# Patient Record
Sex: MALE | Race: WHITE | Employment: PART TIME | URBAN - METROPOLITAN AREA
[De-identification: names, ages, dates, MRNs, and addresses within clinical notes are randomized per-mention and may not be internally consistent; named-entity substitution may affect disease eponyms.]

---

## 2021-07-08 ENCOUNTER — THERAPY VISIT (OUTPATIENT)
Dept: PHYSICAL THERAPY | Facility: CLINIC | Age: 61
End: 2021-07-08
Payer: OTHER MISCELLANEOUS

## 2021-07-08 DIAGNOSIS — M25.511 ACUTE PAIN OF RIGHT SHOULDER: ICD-10-CM

## 2021-07-08 PROCEDURE — 97110 THERAPEUTIC EXERCISES: CPT | Mod: GP | Performed by: PHYSICAL THERAPIST

## 2021-07-08 PROCEDURE — 97161 PT EVAL LOW COMPLEX 20 MIN: CPT | Mod: GP | Performed by: PHYSICAL THERAPIST

## 2021-07-08 NOTE — LETTER
"[facility]  [address] [place] [address]  [facility] [id number] [phone]    2021    Re: Jeff Goldstein   :   1960  MRN:  [id number]   REFERRING PHYSICIAN:   Butch [facility]    Date of Initial Evaluation:  2021  Visits:  Rxs Used: 1  Reason for Referral:  Acute pain of right shoulder    EVALUATION SUMMARY    Physical Therapy Initial Evaluation  Therapist Generated HPI Evaluation  Problem details: Pt presents with complaints of R shoulder pain. Pt reported onset of sx's on May 13, 2021 while he was closing a van door. Pt heard a \"pop\" and felt pain at the time. Pt reported MD office visit on 21; referral to PT generated at that time. Pt reported sx's are gradually worsening-noting increased intensity of pain with activity. 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 Pt reported no prior history of R shoulder pain.         Type of problem:  Right shoulder.  This is a new condition.  Condition occurred with:  Other.  Where condition occurred: at work.  Patient reports pain:  In the joint and anterior.  and is intermittent.  Pain radiates to:  Upper arm. Pain is worse during the day and worse during the night.  Since onset symptoms are gradually worsening.  Associated symptoms:  Catching. Symptoms are exacerbated by lying on extremity, using arm at shoulder level and using arm behind back      Patient Health History  Pain is reported as 6/10 on pain scale.  General health as reported by patient is good.  Pertinent medical history includes: high blood pressure and overweight.   Current medications:  High blood pressure medication.    Current occupation is .   Primary job tasks include:  Driving, lifting/carrying and pushing/pulling.     Objective:  System  Shoulder Evaluation:  ROM:  AROM:    Flexion:  Left:  160 "    Right:  150  Abduction:  Left: 160   Right:  135  Extension/Internal Rotation:  Left:  T8    Right:  L4    PROM:    Flexion:  Right: end range pain    Abduction:  Right:  End range pain  Internal Rotation:  Left:  WNL    Right:  WNL  External Rotation:  Left:  ~70 degrees    Right:  ~60 degrees/pain  Strength:  : unable to perform lift off test on R.  Abduction:  Left: 5/5  Pain:    Right: 5/5     Pain:  Internal Rotation:  Left:5/5     Pain:    Right: 5/5     Pain:  External Rotation:   Left:5/5     Pain:   Right:5/5     Pain:      ROS  Assessment/Plan:    Patient is a 60 year old male with right side shoulder complaints.    Patient has the following significant findings with corresponding treatment plan.                Diagnosis 1:  R shoulder pain  Pain -  self management, education and home program  Decreased ROM/flexibility - therapeutic exercise  Decreased strength - therapeutic exercise and therapeutic activities  Decreased function - therapeutic activities    Therapy Evaluation Codes:   1) History comprised of:   Personal factors that impact the plan of care:      None.    Comorbidity factors that impact the plan of care are:      None.     Medications impacting care: None.  2) Examination of Body Systems comprised of:   Body structures and functions that impact the plan of care:      Shoulder.   Activity limitations that impact the plan of care are:      Dressing, Lifting and Sleeping.  3) Clinical presentation characteristics are:   Stable/Uncomplicated..    Cumulative Therapy Evaluation is: Low complexity.  Previous and current functional limitations:  (See Goal Flow Sheet for this information)    Short term and Long term goals: (See Goal Flow Sheet for this information)   Communication ability:  Patient appears to be able to clearly communicate and understand verbal and written communication and follow directions correctly.  Treatment Explanation - The following has been discussed with the patient:    RX ordered/plan of care  Anticipated outcomes  Possible risks and side effects  This patient would benefit from PT intervention to resume normal activities.   Rehab potential is good.  Frequency:  1 X week, once daily  Duration:  for 6 visits  Discharge Plan:  Independent in home treatment program.  Reach maximal therapeutic benefit.              Thank you for your referral.    INQUIRIES  Therapist: Ursula Duarte PT   [facility] SERVICES [address] [address]  Phone: [phone]  Fax: [phone]

## 2021-07-08 NOTE — PROGRESS NOTES
"Physical Therapy Initial Evaluation  Subjective:  The history is provided by the patient. No  was used.   Therapist Generated HPI Evaluation  Problem details: Pt presents with complaints of R shoulder pain. Pt reported onset of sx's on May 13, 2021 while he was closing a van door. Pt heard a \"pop\" and felt pain at the time. Pt reported MD office visit on 5-14-21; referral to PT generated at that time. Pt reported sx's are gradually worsening-noting increased intensity of pain with activity. 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 Pt reported no prior history of R shoulder pain.         Type of problem:  Right shoulder.    This is a new condition.  Condition occurred with:  Other.  Where condition occurred: at work.  Patient reports pain:  In the joint and anterior.  and is intermittent.  Pain radiates to:  Upper arm. Pain is worse during the day and worse during the night.  Since onset symptoms are gradually worsening.  Associated symptoms:  Catching. Symptoms are exacerbated by lying on extremity, using arm at shoulder level and using arm behind back                                Objective:  System                   Shoulder Evaluation:  ROM:  AROM:    Flexion:  Left:  160    Right:  150    Abduction:  Left: 160   Right:  135                  Extension/Internal Rotation:  Left:  T8    Right:  L4    PROM:    Flexion:  Right: end range pain      Abduction:  Right:  End range pain    Internal Rotation:  Left:  WNL    Right:  WNL  External Rotation:  Left:  ~70 degrees    Right:  ~60 degrees/pain                    Strength:  : unable to perform lift off test on R.      Abduction:  Left: 5/5  Pain:    Right: 5/5     Pain:    Internal Rotation:  Left:5/5     Pain:    Right: 5/5     Pain:  External Rotation:   Left:5/5     Pain:   Right:5/5     Pain:                                                     General "     ROS    Assessment/Plan:    Patient is a 60 year old male with right side shoulder complaints.    Patient has the following significant findings with corresponding treatment plan.                Diagnosis 1:  R shoulder pain  Pain -  self management, education and home program  Decreased ROM/flexibility - therapeutic exercise  Decreased strength - therapeutic exercise and therapeutic activities  Decreased function - therapeutic activities    Therapy Evaluation Codes:   1) History comprised of:   Personal factors that impact the plan of care:      None.    Comorbidity factors that impact the plan of care are:      None.     Medications impacting care: None.  2) Examination of Body Systems comprised of:   Body structures and functions that impact the plan of care:      Shoulder.   Activity limitations that impact the plan of care are:      Dressing, Lifting and Sleeping.  3) Clinical presentation characteristics are:   Stable/Uncomplicated.  Cumulative Therapy Evaluation is: Low complexity.    Previous and current functional limitations:  (See Goal Flow Sheet for this information)    Short term and Long term goals: (See Goal Flow Sheet for this information)     Communication ability:  Patient appears to be able to clearly communicate and understand verbal and written communication and follow directions correctly.  Treatment Explanation - The following has been discussed with the patient:   RX ordered/plan of care  Anticipated outcomes  Possible risks and side effects  This patient would benefit from PT intervention to resume normal activities.   Rehab potential is good.    Frequency:  1 X week, once daily  Duration:  for 6 visits  Discharge Plan:  Independent in home treatment program.  Reach maximal therapeutic benefit.    Please refer to the daily flowsheet for treatment today, total treatment time and time spent performing 1:1 timed codes.

## 2021-07-09 NOTE — PROGRESS NOTES
Physical Therapy Initial Evaluation  Subjective:    Patient Health History         Pain is reported as 6/10 on pain scale.  General health as reported by patient is good.  Pertinent medical history includes: high blood pressure and overweight.            Current medications:  High blood pressure medication.    Current occupation is .   Primary job tasks include:  Driving, lifting/carrying and pushing/pulling.                                    Objective:  System    Physical Exam    General     ROS    Assessment/Plan:

## 2021-07-12 ENCOUNTER — THERAPY VISIT (OUTPATIENT)
Dept: PHYSICAL THERAPY | Facility: CLINIC | Age: 61
End: 2021-07-12
Payer: OTHER MISCELLANEOUS

## 2021-07-12 DIAGNOSIS — M25.511 ACUTE PAIN OF RIGHT SHOULDER: ICD-10-CM

## 2021-07-12 PROCEDURE — 97112 NEUROMUSCULAR REEDUCATION: CPT | Mod: GP | Performed by: PHYSICAL THERAPIST

## 2021-07-12 PROCEDURE — 97530 THERAPEUTIC ACTIVITIES: CPT | Mod: GP | Performed by: PHYSICAL THERAPIST

## 2021-07-12 PROCEDURE — 97110 THERAPEUTIC EXERCISES: CPT | Mod: GP | Performed by: PHYSICAL THERAPIST

## 2021-07-19 ENCOUNTER — THERAPY VISIT (OUTPATIENT)
Dept: PHYSICAL THERAPY | Facility: CLINIC | Age: 61
End: 2021-07-19
Payer: OTHER MISCELLANEOUS

## 2021-07-19 DIAGNOSIS — M25.511 ACUTE PAIN OF RIGHT SHOULDER: ICD-10-CM

## 2021-07-19 PROCEDURE — 97530 THERAPEUTIC ACTIVITIES: CPT | Mod: GP | Performed by: PHYSICAL THERAPIST

## 2021-07-19 PROCEDURE — 97110 THERAPEUTIC EXERCISES: CPT | Mod: GP | Performed by: PHYSICAL THERAPIST

## 2021-08-02 ENCOUNTER — THERAPY VISIT (OUTPATIENT)
Dept: PHYSICAL THERAPY | Facility: CLINIC | Age: 61
End: 2021-08-02
Payer: OTHER MISCELLANEOUS

## 2021-08-02 DIAGNOSIS — M25.511 ACUTE PAIN OF RIGHT SHOULDER: ICD-10-CM

## 2021-08-02 PROCEDURE — 97530 THERAPEUTIC ACTIVITIES: CPT | Mod: GP | Performed by: PHYSICAL THERAPIST

## 2021-08-02 NOTE — PROGRESS NOTES
PROGRESS  REPORT    Progress reporting period is from 7-8-21 to 8-2-21. Pt has completed 4 visits..       SUBJECTIVE  Subjective changes noted by patient:  Reported increased pain with movement above shoulder height-putting up a light fixture last week and had difficulty using the R UE. Overall reports no improvement in pain level.    Current pain level is up to 9/10.     Previous pain level was up to 9/10.   Changes in function:  None  Adverse reaction to treatment or activity: activity - see above.    OBJECTIVE  Objective: AROM: shoulder flexion R: 135 degrees with pain onset; 170 degree L; scaption R: 155 degrees; 175 degrees L. Ext/IR: T11 R; T7 L. MMT: ER: 5/5; IR: 5/5 (arm in 0 degrees abduction); unable to perform lift off however.     ASSESSMENT/PLAN  Updated problem list and treatment plan: Diagnosis 1:   R shoulder pain  Pain -  self management, education and home program  Decreased ROM/flexibility - manual therapy and therapeutic exercise  Decreased strength - therapeutic exercise and therapeutic activities  Decreased function - therapeutic activities  STG/LTGs have been met or progress has been made towards goals:  None  Assessment of Progress: The patient's condition is unchanged. Suspect RC tear; possible labral tear.  Self Management Plans:  Patient has been instructed in a home treatment program.  Jeff continues to require the following intervention to meet STG and LTG's:  Recommend pt return to primary.    Recommendations:  No further PT currently planned. Due to lack of improvement over the past month, recommended pt contact primary MD.     Please refer to the daily flowsheet for treatment today, total treatment time and time spent performing 1:1 timed codes.

## 2021-12-30 PROBLEM — M25.511 SHOULDER PAIN, RIGHT: Status: RESOLVED | Noted: 2021-07-08 | Resolved: 2021-12-30

## 2025-07-10 ENCOUNTER — OFFICE VISIT (OUTPATIENT)
Dept: URGENT CARE | Facility: URGENT CARE | Age: 65
End: 2025-07-10
Payer: COMMERCIAL

## 2025-07-10 ENCOUNTER — ANCILLARY PROCEDURE (OUTPATIENT)
Dept: GENERAL RADIOLOGY | Facility: CLINIC | Age: 65
End: 2025-07-10
Attending: NURSE PRACTITIONER
Payer: COMMERCIAL

## 2025-07-10 VITALS
HEART RATE: 91 BPM | WEIGHT: 265 LBS | OXYGEN SATURATION: 94 % | RESPIRATION RATE: 18 BRPM | TEMPERATURE: 98.5 F | SYSTOLIC BLOOD PRESSURE: 142 MMHG | DIASTOLIC BLOOD PRESSURE: 96 MMHG

## 2025-07-10 DIAGNOSIS — S67.21XA CRUSHING INJURY OF RIGHT HAND, INITIAL ENCOUNTER: ICD-10-CM

## 2025-07-10 DIAGNOSIS — S62.646A CLOSED NONDISPLACED FRACTURE OF PROXIMAL PHALANX OF RIGHT LITTLE FINGER, INITIAL ENCOUNTER: ICD-10-CM

## 2025-07-10 DIAGNOSIS — S61.214A LACERATION OF RIGHT RING FINGER WITHOUT FOREIGN BODY WITHOUT DAMAGE TO NAIL, INITIAL ENCOUNTER: Primary | ICD-10-CM

## 2025-07-10 PROBLEM — E11.9 TYPE 2 DIABETES MELLITUS WITHOUT COMPLICATIONS (H): Status: ACTIVE | Noted: 2025-07-10

## 2025-07-10 PROBLEM — I10 ESSENTIAL HYPERTENSION: Status: ACTIVE | Noted: 2021-09-30

## 2025-07-10 RX ORDER — AMLODIPINE BESYLATE 10 MG/1
10 TABLET ORAL DAILY
COMMUNITY
Start: 2024-12-05

## 2025-07-10 RX ORDER — SULFAMETHOXAZOLE AND TRIMETHOPRIM 800; 160 MG/1; MG/1
1 TABLET ORAL 2 TIMES DAILY
Qty: 20 TABLET | Refills: 0 | Status: SHIPPED | OUTPATIENT
Start: 2025-07-10 | End: 2025-07-20

## 2025-07-10 RX ORDER — HYDROCHLOROTHIAZIDE 12.5 MG/1
12.5 TABLET ORAL DAILY
COMMUNITY
Start: 2025-05-05

## 2025-07-10 RX ORDER — ASPIRIN 81 MG/1
81 TABLET, COATED ORAL DAILY
COMMUNITY
Start: 2024-08-19

## 2025-07-10 RX ORDER — OMEPRAZOLE 20 MG/1
CAPSULE, DELAYED RELEASE ORAL
COMMUNITY

## 2025-07-10 RX ORDER — LISINOPRIL 40 MG/1
40 TABLET ORAL DAILY
COMMUNITY
Start: 2024-04-05

## 2025-07-10 NOTE — PROGRESS NOTES
Assessment & Plan      Diagnosis Comments   1. Laceration of right ring finger without foreign body without damage to nail, initial encounter  REPAIR SUPERFICIAL, WOUND BODY < =2.5CM, XR Hand Right G/E 3 Views, sulfamethoxazole-trimethoprim (BACTRIM DS) 800-160 MG tablet, lidocaine 2%-EPINEPHrine 1:100,000 injection 1 mL       2. Crushing injury of right hand, initial encounter  XR Hand Right G/E 3 Views, Orthopedic  Referral, sulfamethoxazole-trimethoprim (BACTRIM DS) 800-160 MG tablet       3. Closed nondisplaced fracture of proximal phalanx of right little finger, initial encounter  Orthopedic  Referral, sulfamethoxazole-trimethoprim (BACTRIM DS) 800-160 MG tablet       Home instructions reviewed with patient for home care related to keeping laceration clean and dry.  Return to clinic in 7 to 10 days to have sutures removed   Will follow-up with orthopedic hand specialty related to nondisplaced fracture splint applied in clinic.    Red flags were discussed patient verbalized understanding    45 minutes spent on the date of the encounter doing chart review, patient visit, and documentation     MARIO B[facility] Aguilar is a 64 year old male who presents to clinic today for the following health issues:  Chief Complaint   Patient presents with    Urgent Care     Pt states he was loading a trailer and cut ring finger on right hand.Last TDAP 2017         7/10/2025     4:24 PM   Additional Questions   Roomed by Shahrzad CASTILLO      Laceration    Mechanism of injury: crushed hand between kitchen and a vehicle the laceration at the base of his fourth finger and fifth finger.  History provided by: Patient  Time of injury was 1 hours(s) ago.    This is a non-work related injury.    Associated symptoms: Denies numbness, weakness, or loss of function    Last tetanus booster within 10 years: Yes    LACERATION EXAM:   Size of laceration: 2  centimeters  Characteristics of the laceration: clean, linear, and swollen  Depth of laceration: superficial  Tendon function intact: Yes  Sensation to light touch intact: Yes  Pulses/capillary refill intact: Yes  Foreign body: No    Picture included in patient's chart: no    PROCEDURE NOTE:  Anesthesia: Wound was locally injected with 1 cc's of  Lidocaine 2% with epinephrine  Prepped and draped in the usual sterile fashion  Wound irrigated with sterile water  Wound was explored for any foreign bodies and evaluated for tendon, nerve, vessel or joint involvement.    Closure was simple  Laceration was closed with 2 X 4.0 Nylon interrupted sutures  Bandage was applied  Patient tolerated the procedure well      Review of Systems  Constitutional, HEENT, cardiovascular, pulmonary, gi and gu systems are negative, except as otherwise noted.      Objective    BP (!) 142/96   Pulse 91   Temp 98.5  F (36.9  C)   Resp 18   Wt 120.2 kg (265 lb)   SpO2 94%   Physical Exam   GENERAL: alert and no distress  NECK: no adenopathy, no asymmetry, masses, or scars  RESP: lungs clear to auscultation - no rales, rhonchi or wheezes  CV: regular rate and rhythm, normal S1 S2, no S3 or S4, no murmur, click or rub, no peripheral edema  ABDOMEN: soft, nontender, no hepatosplenomegaly, no masses and bowel sounds normal  MS: Hand swelling with tenderness decreased range of motion CMS intact normal pulses  SKIN: As noted above    Results for orders placed or performed in visit on 07/10/25   XR Hand Right G/E 3 Views     Status: None    Narrative    EXAM: XR HAND RIGHT G/E 3 VIEWS  LOCATION: [facility]  DATE: 7/10/2025    INDICATION:  Laceration of right ring finger without foreign body without damage to nail, initial encounter, Crushing injury of right hand, initial encounter  COMPARISON: None.      Impression    IMPRESSION: Acute nondisplaced fracture of the proximal phalanx of the right pinky finger.

## 2025-07-10 NOTE — PROGRESS NOTES
Urgent Care Clinic Visit    Chief Complaint   Patient presents with    Urgent Care     Pt states he was loading a trailer and cut ring finger on right hand.Last TDAP 2017            7/10/2025     4:24 PM   Additional Questions   Roomed by Shahrzad

## 2025-07-11 NOTE — PROGRESS NOTES
Orthopaedic Surgery Hand and Upper Extremity New Clinic Note:  Date: Jul 16, 2025     Visit Provider: Derrick Sutherland MD  Patient ID:[id number]    Chief Complaint: Closed nondisplaced fracture of proximal phalanx of right little finger.    Laceration of right ring finger    Dominant Hand: right    Occupation: retired    HPI:    Jeff Goldstein is a 64 year old male who presents today complaining of right hand injury. The patient states 6 days prior they sustained an injury with complaints of immediate pain and deformity in the right hand. They presented to an urgent care where they obtained x-rays of the right hand which demonstrated a fracture of the proximal phalanx of 5th digit. There has never been prior injury or pain of the elbow or arm. Patient also sustained a laceration to the right ring finger, which was irrigated and closed with sutures.    Symptom Onset: 7/10/2025  Trauma/Inciting Event: loading a trailer onto a hitch, and crushed his finger in between the trailer and vehicle.  Location of pain/symptoms: right little fingers  Duration (constant/Intermittent, etc): constant  Characteristics of pain (sharp, dull, etc): sharp  Aggravating factors: bumping it on something  Prior Treatment: splint  Injections (date): none  EMG (for carpal tunnel, etc): none    PMH  Diabetes: no  Thyroid Problems: no  Smoking Y/N: no    Histories:  No past medical history on file.  No past surgical history on file.  No family history on file.  Social History     Tobacco Use    Smoking status: Never    Smokeless tobacco: Never         Allergies:  No Known Allergies    Review of Systems:  Patient denies fever, chills, sweats, anorexia, fatigue, blurred vision, nasal congestion, sore throat, chest pain, weight gain, cough, shortness of breath, nausea, vomiting, change in bowel or bladder habits, or muscle cramps.    Vital Signs:    Wt 120.2 kg (265 lb)     General Physical Exam:    General appearance: No apparent distress,  conversant, alert and oriented   Cardiovascular: Brisk capillary refill distally  Lungs: Normal respiratory effort; no cough or wheezing; no cyanosis  Lymphatic: No peripheral edema or extremity lymphadenopathy  Musculoskeletal: No gross deformity of lower extremities  Skin: Normal temperature, turgor and texture; no rash, ulcers or excoriations  Neurologic: No tremor; no rigidity or flaccidity  Psych: Appropriate affect, alert and oriented to person, place and time    Orthopedic examination:    Right upper extremity    There is a subcentimeter laceration to the base of the ring finger well-approximated with sutures  No evidence of infection, skin clean, dry no erythema or ecchymosis to the ring finger, diffuse ecchymosis and swelling to the small finger  Unable to achieve full composite flexion due to pain in the small finger  Able to achieve full extension of all digits  No obvious rotational deformities, normal digital cascade  Sensation grossly intact to median radial ulnar nerve distribution as well as radial and ulnar nerve aspects of each digit  Capillary beds distally warm well-perfused      Imaging and Diagnostics:    Three-view x-ray of the right hand obtained in the office and interpreted by me today demonstrates an acute fracture of the proximal phalanx of the small finger without significant displacement  I have personally reviewed the above images and labs.       Assessment and Plan:    Right small finger proximal phalanx fracture, nondisplaced    I reviewed my clinical and radiographic findings today.  I reviewed the natural history and course of this condition with the patient.  They were offered the opportunity to ask questions which were answered to their satisfaction.  I discussed with him that at this time I would recommend a course of immobilization in order to let the fracture unite.  He will be placed into an ulnar gutter Exos splint in intrinsic plus position.  I recommended that he return in  1 month for repeat clinical and radiographic evaluation.  He should wear the brace full-time except for showers.  We will likely begin a course of hand therapy if he is having some hand stiffness at that time      Problem List Items Addressed This Visit    None  Visit Diagnoses         Closed nondisplaced fracture of proximal phalanx of right little finger, initial encounter    -  Primary    Relevant Orders    XR Finger Right G/E 2 Views    Wrist/Arm/Hand Bracing Supplies Order Other (comments) (Exos boxers fracture brace - right)      Crushing injury of right hand, initial encounter                   Derrick Sutherland MD    Hand, Upper Extremity & Microvascular Surgery  Department of Orthopaedic Surgery  [facility]       Orders Placed During This Visit:    Orders Placed This Encounter   Procedures    XR Finger Right G/E 2 Views    Wrist/Arm/Hand Bracing Supplies Order Other (comments) (Exos boxers fracture brace - right)

## 2025-07-16 ENCOUNTER — ANCILLARY PROCEDURE (OUTPATIENT)
Dept: GENERAL RADIOLOGY | Facility: CLINIC | Age: 65
End: 2025-07-16
Attending: STUDENT IN AN ORGANIZED HEALTH CARE EDUCATION/TRAINING PROGRAM
Payer: COMMERCIAL

## 2025-07-16 ENCOUNTER — OFFICE VISIT (OUTPATIENT)
Dept: ORTHOPEDICS | Facility: CLINIC | Age: 65
End: 2025-07-16
Attending: NURSE PRACTITIONER
Payer: COMMERCIAL

## 2025-07-16 VITALS — WEIGHT: 265 LBS

## 2025-07-16 DIAGNOSIS — S62.646A CLOSED NONDISPLACED FRACTURE OF PROXIMAL PHALANX OF RIGHT LITTLE FINGER, INITIAL ENCOUNTER: Primary | ICD-10-CM

## 2025-07-16 DIAGNOSIS — S67.21XA CRUSHING INJURY OF RIGHT HAND, INITIAL ENCOUNTER: ICD-10-CM

## 2025-07-16 DIAGNOSIS — S62.646A CLOSED NONDISPLACED FRACTURE OF PROXIMAL PHALANX OF RIGHT LITTLE FINGER, INITIAL ENCOUNTER: ICD-10-CM

## 2025-07-16 PROCEDURE — 99203 OFFICE O/P NEW LOW 30 MIN: CPT | Performed by: STUDENT IN AN ORGANIZED HEALTH CARE EDUCATION/TRAINING PROGRAM

## 2025-07-16 PROCEDURE — 73140 X-RAY EXAM OF FINGER(S): CPT | Mod: TC | Performed by: RADIOLOGY

## 2025-07-16 NOTE — LETTER
7/16/2025      Jeff Goldstein  [address]  [facility] [address]      Dear Colleague,    Thank you for referring your patient, Jeff Goldstein, to the [facility] [place]. Please see a copy of my visit note below.    Orthopaedic Surgery Hand and Upper Extremity New Clinic Note:  Date: Jul 16, 2025     Visit Provider: Derrick Sutherland MD  Patient ID:[id number]    Chief Complaint: Closed nondisplaced fracture of proximal phalanx of right little finger.    Laceration of right ring finger    Dominant Hand: right    Occupation: retired    HPI:    Jeff Goldstein is a 64 year old male who presents today complaining of right hand injury. The patient states 6 days prior they sustained an injury with complaints of immediate pain and deformity in the right hand. They presented to an urgent care where they obtained x-rays of the right hand which demonstrated a fracture of the proximal phalanx of 5th digit. There has never been prior injury or pain of the elbow or arm. Patient also sustained a laceration to the right ring finger, which was irrigated and closed with sutures.    Symptom Onset: 7/10/2025  Trauma/Inciting Event: loading a trailer onto a hitch, and crushed his finger in between the trailer and vehicle.  Location of pain/symptoms: right little fingers  Duration (constant/Intermittent, etc): constant  Characteristics of pain (sharp, dull, etc): sharp  Aggravating factors: bumping it on something  Prior Treatment: splint  Injections (date): none  EMG (for carpal tunnel, etc): none    PMH  Diabetes: no  Thyroid Problems: no  Smoking Y/N: no    Histories:  No past medical history on file.  No past surgical history on file.  No family history on file.  Social History     Tobacco Use     Smoking status: Never     Smokeless tobacco: Never         Allergies:  No Known Allergies    Review of Systems:  Patient denies fever, chills, sweats, anorexia, fatigue, blurred vision, nasal  congestion, sore throat, chest pain, weight gain, cough, shortness of breath, nausea, vomiting, change in bowel or bladder habits, or muscle cramps.    Vital Signs:    Wt 120.2 kg (265 lb)     General Physical Exam:    General appearance: No apparent distress, conversant, alert and oriented   Cardiovascular: Brisk capillary refill distally  Lungs: Normal respiratory effort; no cough or wheezing; no cyanosis  Lymphatic: No peripheral edema or extremity lymphadenopathy  Musculoskeletal: No gross deformity of lower extremities  Skin: Normal temperature, turgor and texture; no rash, ulcers or excoriations  Neurologic: No tremor; no rigidity or flaccidity  Psych: Appropriate affect, alert and oriented to person, place and time    Orthopedic examination:    Right upper extremity    There is a subcentimeter laceration to the base of the ring finger well-approximated with sutures  No evidence of infection, skin clean, dry no erythema or ecchymosis to the ring finger, diffuse ecchymosis and swelling to the small finger  Unable to achieve full composite flexion due to pain in the small finger  Able to achieve full extension of all digits  No obvious rotational deformities, normal digital cascade  Sensation grossly intact to median radial ulnar nerve distribution as well as radial and ulnar nerve aspects of each digit  Capillary beds distally warm well-perfused      Imaging and Diagnostics:    Three-view x-ray of the right hand obtained in the office and interpreted by me today demonstrates an acute fracture of the proximal phalanx of the small finger without significant displacement  I have personally reviewed the above images and labs.       Assessment and Plan:    Right small finger proximal phalanx fracture, nondisplaced    I reviewed my clinical and radiographic findings today.  I reviewed the natural history and course of this condition with the patient.  They were offered the opportunity to ask questions which were  answered to their satisfaction.  I discussed with him that at this time I would recommend a course of immobilization in order to let the fracture unite.  He will be placed into an ulnar gutter Exos splint in intrinsic plus position.  I recommended that he return in 1 month for repeat clinical and radiographic evaluation.  He should wear the brace full-time except for showers.  We will likely begin a course of hand therapy if he is having some hand stiffness at that time      Problem List Items Addressed This Visit    None  Visit Diagnoses         Closed nondisplaced fracture of proximal phalanx of right little finger, initial encounter    -  Primary    Relevant Orders    XR Finger Right G/E 2 Views    Wrist/Arm/Hand Bracing Supplies Order Other (comments) (Exos boxers fracture brace - right)      Crushing injury of right hand, initial encounter                   Derrick Sutherland MD    Hand, Upper Extremity & Microvascular Surgery  Department of Orthopaedic Surgery  [facility]       Orders Placed During This Visit:    Orders Placed This Encounter   Procedures     XR Finger Right G/E 2 Views     Wrist/Arm/Hand Bracing Supplies Order Other (comments) (Exos boxers fracture brace - right)         Again, thank you for allowing me to participate in the care of your patient.        Sincerely,        Derrick Sutherland MD    Electronically signed

## 2025-08-13 ENCOUNTER — ANCILLARY PROCEDURE (OUTPATIENT)
Dept: GENERAL RADIOLOGY | Facility: CLINIC | Age: 65
End: 2025-08-13
Attending: STUDENT IN AN ORGANIZED HEALTH CARE EDUCATION/TRAINING PROGRAM
Payer: COMMERCIAL

## 2025-08-13 ENCOUNTER — OFFICE VISIT (OUTPATIENT)
Dept: ORTHOPEDICS | Facility: CLINIC | Age: 65
End: 2025-08-13
Payer: COMMERCIAL

## 2025-08-13 DIAGNOSIS — S62.646A CLOSED NONDISPLACED FRACTURE OF PROXIMAL PHALANX OF RIGHT LITTLE FINGER, INITIAL ENCOUNTER: Primary | ICD-10-CM

## 2025-08-13 DIAGNOSIS — S62.646A CLOSED NONDISPLACED FRACTURE OF PROXIMAL PHALANX OF RIGHT LITTLE FINGER, INITIAL ENCOUNTER: ICD-10-CM

## 2025-08-13 PROCEDURE — 73140 X-RAY EXAM OF FINGER(S): CPT | Mod: TC | Performed by: RADIOLOGY

## 2025-08-13 PROCEDURE — 99213 OFFICE O/P EST LOW 20 MIN: CPT | Performed by: STUDENT IN AN ORGANIZED HEALTH CARE EDUCATION/TRAINING PROGRAM
